# Patient Record
(demographics unavailable — no encounter records)

---

## 2025-07-14 NOTE — HISTORY OF PRESENT ILLNESS
[FreeTextEntry2] : Dominik is a 22 month old girl presenting for evaluation of breast development.   Dominik has had breast buds since birth and it did not resolve. It initially seemed like fatty tissue and now seems like buds. Mom feels they increased in size over time. No galactorrhea. No vaginal bleeding or spotting, she may have had hat some as a . No axillary or pubic hair development.  She used to have constipation and has a possible hemorrhoid due to it. She has been on a lot of antibiotics for ear infections, developed loose stools, and has been on probiotics. She now has a bowel movement 1-2 times/day, soft.  Dominik had ear tubes placed 3 weeks ago. She has chronic rhinorrhea since starting .  She eats well, does not like vegetables, loves pasta, mac and cheese. She is breastfeeding and drinks water.

## 2025-07-14 NOTE — FAMILY HISTORY
[de-identified] : 5'4.75" [FreeTextEntry1] : 5'11" [FreeTextEntry3] : 5'5.375" [FreeTextEntry5] : 9-10

## 2025-07-14 NOTE — PAST MEDICAL HISTORY
[At ___ Weeks Gestation] : at [unfilled] weeks gestation [Normal Vaginal Route] : by normal vaginal route [None] : there were no delivery complications [Age Appropriate] : age appropriate developmental milestones met [FreeTextEntry1] : 7 lbs 5 oz [FreeTextEntry4] : pre-eclampsia

## 2025-07-14 NOTE — PHYSICAL EXAM
[Healthy Appearing] : healthy appearing [Well Nourished] : well nourished [Interactive] : interactive [Normal Appearance] : normal appearance [Well formed] : well formed [Normally Set] : normally set [Normal S1 and S2] : normal S1 and S2 [Murmur] : no murmurs [Clear to Ausculation Bilaterally] : clear to auscultation bilaterally [Abdomen Soft] : soft [Abdomen Tenderness] : non-tender [] : no hepatosplenomegaly [1] : was Jose stage 1 [Normal] : normal  [FreeTextEntry2] : axillary hair absent [FreeTextEntry1] : magda 3 appearing breasts- glandular and fatty tissue beyond a bud

## 2025-07-14 NOTE — CONSULT LETTER
[Dear  ___] : Dear  [unfilled], [Consult Letter:] : I had the pleasure of evaluating your patient, [unfilled]. [Please see my note below.] : Please see my note below. [Consult Closing:] : Thank you very much for allowing me to participate in the care of this patient.  If you have any questions, please do not hesitate to contact me. [Sincerely,] : Sincerely, [FreeTextEntry3] : Alayna Condon MD [facility] Physician [facility] Division of Pediatric Endocrinology